# Patient Record
Sex: FEMALE | Race: WHITE | Employment: OTHER | URBAN - METROPOLITAN AREA
[De-identification: names, ages, dates, MRNs, and addresses within clinical notes are randomized per-mention and may not be internally consistent; named-entity substitution may affect disease eponyms.]

---

## 2020-06-25 ENCOUNTER — TELEPHONE (OUTPATIENT)
Dept: SURGERY | Facility: CLINIC | Age: 80
End: 2020-06-25

## 2020-06-25 NOTE — TELEPHONE ENCOUNTER
Called and left pt a VM requesting they please call us back. Pt scheduled an OV on 07/06 for severe hand pain. Called pt to offer a sooner appt, please re schedule pt when she calls back. Thank You.

## 2020-06-29 ENCOUNTER — OFFICE VISIT (OUTPATIENT)
Dept: SURGERY | Facility: CLINIC | Age: 80
End: 2020-06-29
Payer: MEDICARE

## 2020-06-29 DIAGNOSIS — M18.12 PRIMARY OSTEOARTHRITIS OF FIRST CARPOMETACARPAL JOINT OF LEFT HAND: ICD-10-CM

## 2020-06-29 DIAGNOSIS — M79.642 PAIN OF LEFT HAND: Primary | ICD-10-CM

## 2020-06-29 PROCEDURE — 99203 OFFICE O/P NEW LOW 30 MIN: CPT | Performed by: PLASTIC SURGERY

## 2020-06-29 PROCEDURE — L3999 UPPER LIMB ORTHOSIS NOS: HCPCS | Performed by: PLASTIC SURGERY

## 2020-06-29 RX ORDER — ASCORBIC ACID 500 MG
500 TABLET ORAL DAILY
COMMUNITY

## 2020-06-29 RX ORDER — ZOLPIDEM TARTRATE 5 MG/1
TABLET ORAL
COMMUNITY
Start: 2020-06-15

## 2020-06-29 RX ORDER — B-COMPLEX WITH VITAMIN C
TABLET ORAL
COMMUNITY

## 2020-06-29 RX ORDER — OMEPRAZOLE 20 MG/1
CAPSULE, DELAYED RELEASE ORAL
COMMUNITY
Start: 2020-04-14

## 2020-06-29 RX ORDER — ATORVASTATIN CALCIUM 40 MG/1
40 TABLET, FILM COATED ORAL DAILY
COMMUNITY
Start: 2020-05-22

## 2020-06-29 RX ORDER — NICOTINE POLACRILEX 2 MG
1 GUM BUCCAL DAILY
COMMUNITY

## 2020-06-29 RX ORDER — FERROUS SULFATE 325(65) MG
325 TABLET ORAL DAILY
COMMUNITY
Start: 2011-01-13

## 2020-06-29 NOTE — H&P
Sita Rubin is a 68year old female that presents with Patient presents with:  Pain: Left hand and left wrist radial   .    REFERRED BY:  No ref.  provider found      Pacemaker: No  Latex Allergy: no  Coumadin: No  Plavix: No  Other anticoagulants: Comment:hives  Pneumococcal Polysa*    SWELLING    Comment: Arm swelled up 2014  Morphine                NAUSEA AND VOMITING  Prednisone              OTHER (SEE COMMENTS)    Comment: retina wrinkled     MEDICATIONS  Current Outpatient Medications   Medicat thumb [facility]    We discussed what arthritis is, including treatment options. Arthritis is not curable. Treatment is designed to try to improve symptoms and function, but this is not always possible.   Arthritis may require multiple treatments over a long per

## 2020-10-20 ENCOUNTER — TELEPHONE (OUTPATIENT)
Dept: SURGERY | Facility: CLINIC | Age: 80
End: 2020-10-20

## 2020-10-20 NOTE — TELEPHONE ENCOUNTER
Pt scheduled an OV via My Chart on 11/02/2020  Called and left pt a VM that her appt has been cx. Per Dr. Celi Shook pt was to follow up with physiatry and has not made that appt.

## 2020-10-26 ENCOUNTER — HOSPITAL ENCOUNTER (OUTPATIENT)
Dept: GENERAL RADIOLOGY | Facility: HOSPITAL | Age: 80
Discharge: HOME | End: 2020-10-26
Attending: PLASTIC SURGERY
Payer: MEDICARE

## 2020-10-26 ENCOUNTER — OFFICE VISIT (OUTPATIENT)
Dept: SURGERY | Facility: CLINIC | Age: 80
End: 2020-10-26
Payer: MEDICARE

## 2020-10-26 DIAGNOSIS — S66.812A EXTENSOR TENDON RUPTURE OF HAND, LEFT, INITIAL ENCOUNTER: ICD-10-CM

## 2020-10-26 DIAGNOSIS — S66.812A EXTENSOR TENDON RUPTURE OF HAND, LEFT, INITIAL ENCOUNTER: Primary | ICD-10-CM

## 2020-10-26 PROCEDURE — 99214 OFFICE O/P EST MOD 30 MIN: CPT | Performed by: PLASTIC SURGERY

## 2020-10-26 PROCEDURE — 73130 X-RAY EXAM OF HAND: CPT | Performed by: PLASTIC SURGERY

## 2020-10-26 NOTE — H&P
Injury 1: LMF  - Date: 10/16/20  - Days Since: [id number] is a 68year old female that presents with Patient presents with: Injury: LMF  . REFERRED BY:  No ref.  provider found    Pacemaker: No  Latex Allergy: no  Coumadin: No  Plavix: No Dispense Refill   • atorvastatin 40 MG Oral Tab Take 40 mg by mouth daily. • omeprazole 20 MG Oral Capsule Delayed Release TAKE 1 CAPSULE EVERY DAY TAKE BEFORE EATING     • Biotin 1 MG Oral Cap Take 1 tablet by mouth daily.      • Multiple Vitamins-Mine time      10/26/2020  Camila Vegas MD